# Patient Record
Sex: FEMALE
[De-identification: names, ages, dates, MRNs, and addresses within clinical notes are randomized per-mention and may not be internally consistent; named-entity substitution may affect disease eponyms.]

---

## 2023-11-23 ENCOUNTER — RX ONLY (OUTPATIENT)
Age: 48
Setting detail: RX ONLY
End: 2023-11-23

## 2024-06-14 ENCOUNTER — APPOINTMENT (RX ONLY)
Dept: URBAN - NONMETROPOLITAN AREA CLINIC 34 | Facility: CLINIC | Age: 49
Setting detail: DERMATOLOGY
End: 2024-06-14

## 2024-06-14 DIAGNOSIS — B35.1 TINEA UNGUIUM: ICD-10-CM

## 2024-06-14 DIAGNOSIS — L81.1 CHLOASMA: ICD-10-CM

## 2024-06-14 PROCEDURE — ? COUNSELING

## 2024-06-14 PROCEDURE — 99203 OFFICE O/P NEW LOW 30 MIN: CPT

## 2024-06-14 PROCEDURE — ? PHOTO-DOCUMENTATION

## 2024-06-14 PROCEDURE — ? PRESCRIPTION MEDICATION MANAGEMENT

## 2024-06-14 ASSESSMENT — LOCATION SIMPLE DESCRIPTION DERM: LOCATION SIMPLE: RIGHT GREAT TOE

## 2024-06-14 ASSESSMENT — LOCATION ZONE DERM: LOCATION ZONE: TOENAIL

## 2024-06-14 ASSESSMENT — LOCATION DETAILED DESCRIPTION DERM: LOCATION DETAILED: RIGHT GREAT TOENAIL

## 2024-06-14 NOTE — HPI: SKIN LESION
What Type Of Note Output Would You Prefer (Optional)?: Standard Output
How Severe Is Your Skin Lesion?: mild
Has Your Skin Lesion Been Treated?: not been treated
Is This A New Presentation, Or A Follow-Up?: Skin Lesion
Additional History: Fungus on her right big toe

## 2024-06-14 NOTE — PROCEDURE: PRESCRIPTION MEDICATION MANAGEMENT
Plan: Rx for compounded 8% hydroquinon/tretinoin 0.025%/kojic acid/fluocinolone cream sent to [facility] for patient to start nightly for 8 weeks then follow up. May continue for an additional 4 weeks for total of 12 or if significant improvement seen at 8 weeks will switch patient to non-hydroquinone containing lightening agent
Render In Strict Bullet Format?: No
Detail Level: Zone
Plan: Start [facility] compounded anti-fungal solution nightly.

## 2024-08-15 ENCOUNTER — APPOINTMENT (RX ONLY)
Dept: URBAN - NONMETROPOLITAN AREA CLINIC 34 | Facility: CLINIC | Age: 49
Setting detail: DERMATOLOGY
End: 2024-08-15

## 2024-08-15 DIAGNOSIS — L81.1 CHLOASMA: ICD-10-CM

## 2024-08-15 PROCEDURE — 99213 OFFICE O/P EST LOW 20 MIN: CPT

## 2024-08-15 PROCEDURE — ? COUNSELING

## 2024-08-15 PROCEDURE — ? PRESCRIPTION MEDICATION MANAGEMENT

## 2024-08-15 NOTE — PROCEDURE: PRESCRIPTION MEDICATION MANAGEMENT
Detail Level: Zone
Plan: Patient had significant improvement with HQ/tretinoin compound-reviewed recent photos on patient's phone. Was slightly irritating to patient. Apply compound hydroquinone 8%/tretinoin 0.025% cream three nights per week and compound TXA 8%/azelaic acid 10% the other nights of the week. Continue aggressive sun protection. Consider having a fan at work to decrease heat as is a significant trigger for patient's melasma. Also recommended patient take Heliocare daily (samples provided). Samples of facial moisturizers given to patient as well. We briefly discussed oral TXA as option. No personal or family hx of blood clots. May consider at follow up. Patient will be traveling and will follow up in clinic when she is back in [place].
Render In Strict Bullet Format?: No